# Patient Record
Sex: MALE | Race: WHITE | NOT HISPANIC OR LATINO | Employment: UNEMPLOYED | URBAN - METROPOLITAN AREA
[De-identification: names, ages, dates, MRNs, and addresses within clinical notes are randomized per-mention and may not be internally consistent; named-entity substitution may affect disease eponyms.]

---

## 2021-12-14 ENCOUNTER — TELEPHONE (OUTPATIENT)
Dept: PEDIATRICS | Facility: OTHER | Age: 31
End: 2021-12-14

## 2021-12-14 NOTE — TELEPHONE ENCOUNTER
Caller: Florentino Hair    Relationship to patient: Self    Best call back number: [phone]    Chief complaint: PATIENT COMPLAINED OF SHARP CHEST PAINS, FLUTTERING, DIZZINESS, AND FEELING SHORT OF BREATH.    Patient directed to call 911 or go to their nearest emergency room.     Patient verbalized understanding: [x] Yes  [] No  If no, why?    Additional notes: PATIENT WAS DIRECTED TO SEEK EMERGENCY TREATMENT AND DID AGREE TO GO DIRECTLY TO THE NEAREST ED.